# Patient Record
Sex: FEMALE | Race: WHITE | NOT HISPANIC OR LATINO | URBAN - METROPOLITAN AREA
[De-identification: names, ages, dates, MRNs, and addresses within clinical notes are randomized per-mention and may not be internally consistent; named-entity substitution may affect disease eponyms.]

---

## 2018-01-10 ENCOUNTER — TELEPHONE (OUTPATIENT)
Dept: TRANSPLANT | Facility: CLINIC | Age: 63
End: 2018-01-10

## 2018-01-10 NOTE — TELEPHONE ENCOUNTER
Intake Progress Note    Organ:  Liver    Initial Call Made by:  Fax from Meme Duque     Referring Physician:  NEO Marcum at [facility]     Assigned Coordinator:  Steffanie North    Reported Diagnosis:  Chronic Hepatits C; Intrahepatic cholangiocarcinoma    Preferred Evaluation Start Date:  ASAP     Best time patient can be reached:    Type of packet sent:  Liver packet sent by Alyson James on 01/10/18 regular mail     Misc. Notes: May speak with emergency contacts listed in OTTR.     Verbal Consent: Y     Email Consent: N    If no PCP or referring information the time of call informed pt to call back with information: Y or N    Informed patient to call if doesn't receive packet and or phone call from coordinator within given time    Medicare A  [id number]  Since 08/01/2013  [employer]  [id number]  Group:  [id number]

## 2018-01-12 PROBLEM — C22.1 CHOLANGIOCARCINOMA (H): Status: ACTIVE | Noted: 2018-01-12

## 2018-01-12 NOTE — TELEPHONE ENCOUNTER
63yo with hx HCV cirrhosis and alcohol abuse referred from Meme Duque at [facility] for transplant evaluation/treatment for biopsy  proven cholangiocarcinoma. Pt has also been referred to surgery and oncology in [place] for treatment (records requested). HCV was treated in 2015. Pt last drink in 2014, she quit after finding out she had liver disease, she did not complete any formal treatment. Pt also has a hx of COPD, she quit smoking last week, prior to that was smoking 1ppd x10 years. Pt has had multiple abd surgeries (see SnapShot). Pt does not work, but reports being independent. She lives with her  and has a son that lives in [place], both are supportive and involved in her care.     MELD: 7  Ascites: none  HE: none  GIB: denies hx  EGD: last done 8/16/17 at [facility]  Coloscopy: last done 4/2008 at [facility] Maintenance: per pt mamogram, pap smear and vaccinations are up to date.     Medical and Surgical hx: SnapShot updated    Plan:   Imaging requested from [facility] for review to determine plan of care.     Pt called and updated of above. Pt expressed understanding and comfort with this plan.

## 2018-01-15 ENCOUNTER — MEDICAL CORRESPONDENCE (OUTPATIENT)
Dept: TRANSPLANT | Facility: CLINIC | Age: 63
End: 2018-01-15

## 2018-01-17 ENCOUNTER — CARE COORDINATION (OUTPATIENT)
Dept: SURGERY | Facility: CLINIC | Age: 63
End: 2018-01-17

## 2018-01-19 ENCOUNTER — HOSPITAL ENCOUNTER (INPATIENT)
Dept: GENERAL RADIOLOGY | Facility: CLINIC | Age: 63
End: 2018-01-19
Attending: INTERNAL MEDICINE

## 2018-01-19 ENCOUNTER — DOCUMENTATION ONLY (OUTPATIENT)
Dept: TRANSPLANT | Facility: CLINIC | Age: 63
End: 2018-01-19

## 2018-01-19 NOTE — PROGRESS NOTES
Pt case reviewed at tumor conference.     Plan:  Not a transplant candidate due to biopsy proven cholangiocarcinoma  Referral to surgical oncology for possible ablation  Coordinate hepatology consult once surgery scheduled    Referring provider Meme Duque updated of above.     Pt called and also updated of above. Pt expressed understanding and comfort with this plan. Message routed to Dr. Velez's care coordinator Aleisha Crenshaw to facilitate surgery oncology referral.

## 2018-01-19 NOTE — Clinical Note
Aleisha,  Patient and referring provider updated of plan.  Please keep me posted on your scheduling as I will try to coordinate hepatology consult around visit with Dr. Velez.   FYI- the pt does have a family member that lives in the [facility] so lodging will not be an issue for her.   Thanks a lot,  Steffanie North RN Liver Transplant Coordinator

## 2018-01-22 ENCOUNTER — TELEPHONE (OUTPATIENT)
Dept: TRANSPLANT | Facility: CLINIC | Age: 63
End: 2018-01-22

## 2018-01-22 NOTE — Clinical Note
ENRIQUE Moraes.   Pt planning to pursue all care at [facility], wishes to cancel appt with Dr. Mandujano at this time.   Thanks,  Steffanie

## 2018-01-22 NOTE — TELEPHONE ENCOUNTER
Call received from pt's referring GI provider Bethany Duque at [facility] indicating that the [place] [facility] and wishes to cancel her appt with surgical oncology at [facility].     Call made to pt to confirm this plan. Pt confirmed she plans to have all of her care at [facility] and would like to cancel her appt at [facility] surgical oncology at this time.     Message routed Dr. Velez's care coordinator updating her of above.

## 2018-01-24 ENCOUNTER — MEDICAL CORRESPONDENCE (OUTPATIENT)
Dept: TRANSPLANT | Facility: CLINIC | Age: 63
End: 2018-01-24